# Patient Record
Sex: FEMALE | NOT HISPANIC OR LATINO
[De-identification: names, ages, dates, MRNs, and addresses within clinical notes are randomized per-mention and may not be internally consistent; named-entity substitution may affect disease eponyms.]

---

## 2018-12-27 ENCOUNTER — APPOINTMENT (OUTPATIENT)
Dept: OPHTHALMOLOGY | Facility: CLINIC | Age: 23
End: 2018-12-27

## 2020-01-17 ENCOUNTER — APPOINTMENT (OUTPATIENT)
Dept: OPHTHALMOLOGY | Facility: CLINIC | Age: 25
End: 2020-01-17
Payer: COMMERCIAL

## 2020-01-17 ENCOUNTER — NON-APPOINTMENT (OUTPATIENT)
Age: 25
End: 2020-01-17

## 2020-01-17 PROCEDURE — 92004 COMPRE OPH EXAM NEW PT 1/>: CPT

## 2020-01-30 ENCOUNTER — APPOINTMENT (OUTPATIENT)
Dept: INTERNAL MEDICINE | Facility: CLINIC | Age: 25
End: 2020-01-30
Payer: COMMERCIAL

## 2020-01-30 ENCOUNTER — RESULT CHARGE (OUTPATIENT)
Age: 25
End: 2020-01-30

## 2020-01-30 ENCOUNTER — TRANSCRIPTION ENCOUNTER (OUTPATIENT)
Age: 25
End: 2020-01-30

## 2020-01-30 VITALS
HEIGHT: 63 IN | HEART RATE: 82 BPM | WEIGHT: 146 LBS | OXYGEN SATURATION: 100 % | DIASTOLIC BLOOD PRESSURE: 70 MMHG | BODY MASS INDEX: 25.87 KG/M2 | SYSTOLIC BLOOD PRESSURE: 110 MMHG | TEMPERATURE: 97.6 F

## 2020-01-30 DIAGNOSIS — Z00.00 ENCOUNTER FOR GENERAL ADULT MEDICAL EXAMINATION W/OUT ABNORMAL FINDINGS: ICD-10-CM

## 2020-01-30 PROCEDURE — 81025 URINE PREGNANCY TEST: CPT

## 2020-01-30 PROCEDURE — 99385 PREV VISIT NEW AGE 18-39: CPT | Mod: 25,GC

## 2020-01-30 PROCEDURE — 36415 COLL VENOUS BLD VENIPUNCTURE: CPT

## 2020-02-01 LAB
BASOPHILS # BLD AUTO: 0.03 K/UL
BASOPHILS NFR BLD AUTO: 0.4 %
EOSINOPHIL # BLD AUTO: 0 K/UL
EOSINOPHIL NFR BLD AUTO: 0 %
HCG UR QL: NEGATIVE
HCT VFR BLD CALC: 42.6 %
HGB BLD-MCNC: 13.5 G/DL
IMM GRANULOCYTES NFR BLD AUTO: 0.1 %
LYMPHOCYTES # BLD AUTO: 2.36 K/UL
LYMPHOCYTES NFR BLD AUTO: 33.8 %
MAN DIFF?: NORMAL
MCHC RBC-ENTMCNC: 29.1 PG
MCHC RBC-ENTMCNC: 31.7 GM/DL
MCV RBC AUTO: 91.8 FL
MONOCYTES # BLD AUTO: 0.78 K/UL
MONOCYTES NFR BLD AUTO: 11.2 %
NEUTROPHILS # BLD AUTO: 3.8 K/UL
NEUTROPHILS NFR BLD AUTO: 54.5 %
PLATELET # BLD AUTO: 299 K/UL
QUALITY CONTROL: YES
RBC # BLD: 4.64 M/UL
RBC # FLD: 13.7 %
T3FREE SERPL-MCNC: 3.47 PG/ML
T4 FREE SERPL-MCNC: 1.1 NG/DL
TSH SERPL-ACNC: 18.5 UIU/ML
WBC # FLD AUTO: 6.98 K/UL

## 2020-02-04 DIAGNOSIS — Z23 ENCOUNTER FOR IMMUNIZATION: ICD-10-CM

## 2020-02-19 ENCOUNTER — NON-APPOINTMENT (OUTPATIENT)
Age: 25
End: 2020-02-19

## 2020-02-20 DIAGNOSIS — T30.0 BURN OF UNSPECIFIED BODY REGION, UNSPECIFIED DEGREE: ICD-10-CM

## 2020-03-26 ENCOUNTER — APPOINTMENT (OUTPATIENT)
Dept: INTERNAL MEDICINE | Facility: CLINIC | Age: 25
End: 2020-03-26

## 2020-03-31 ENCOUNTER — RX RENEWAL (OUTPATIENT)
Age: 25
End: 2020-03-31

## 2020-04-26 ENCOUNTER — MESSAGE (OUTPATIENT)
Age: 25
End: 2020-04-26

## 2020-04-28 ENCOUNTER — APPOINTMENT (OUTPATIENT)
Dept: INTERNAL MEDICINE | Facility: CLINIC | Age: 25
End: 2020-04-28
Payer: COMMERCIAL

## 2020-04-28 PROCEDURE — 36415 COLL VENOUS BLD VENIPUNCTURE: CPT

## 2020-04-30 ENCOUNTER — TRANSCRIPTION ENCOUNTER (OUTPATIENT)
Age: 25
End: 2020-04-30

## 2020-04-30 LAB — TSH SERPL-ACNC: 5.65 UIU/ML

## 2020-04-30 RX ORDER — LEVOTHYROXINE SODIUM 0.1 MG/1
100 TABLET ORAL DAILY
Qty: 60 | Refills: 0 | Status: DISCONTINUED | COMMUNITY
Start: 2020-01-31 | End: 2020-04-30

## 2020-05-01 ENCOUNTER — APPOINTMENT (OUTPATIENT)
Dept: DISASTER EMERGENCY | Facility: CLINIC | Age: 25
End: 2020-05-01

## 2020-05-03 LAB
SARS-COV-2 IGG SERPL IA-ACNC: <0.1 INDEX
SARS-COV-2 IGG SERPL QL IA: NEGATIVE

## 2020-05-04 ENCOUNTER — APPOINTMENT (OUTPATIENT)
Dept: DISASTER EMERGENCY | Facility: CLINIC | Age: 25
End: 2020-05-04

## 2020-05-11 ENCOUNTER — APPOINTMENT (OUTPATIENT)
Dept: ORTHOPEDIC SURGERY | Facility: CLINIC | Age: 25
End: 2020-05-11
Payer: COMMERCIAL

## 2020-05-11 DIAGNOSIS — Z78.9 OTHER SPECIFIED HEALTH STATUS: ICD-10-CM

## 2020-05-11 DIAGNOSIS — M54.16 RADICULOPATHY, LUMBAR REGION: ICD-10-CM

## 2020-05-11 PROCEDURE — 99203 OFFICE O/P NEW LOW 30 MIN: CPT | Mod: 95

## 2020-05-11 NOTE — HISTORY OF PRESENT ILLNESS
[de-identified] : This visit was provided by the [facility] tele[facility] service.  This telehealth appointment was conducted using real-time 2-way audiovisual technology.  The patient Theresa Slade was at her home ([address] [place] [address]) during the time of the visit. The provider, Vinod García was located at his home in [place], [place] at the time of the visit.  The patient and Vinod García participated in the telehealth encounter.  Verbal consent was given on May 11, 2020 by patient Theresa Slade.\par \par HPI:\par Telehealth via [facility] tele[facility] service: 30 minutes\par \par 24 year old female presents with low back pain and new symptoms of pain traveling into her legs.  She says she has had low back pain for over 2 years which she has treated with acupuncture.  She cannot recall an inciting event or trauma for her low back pain.  More recently she has noticed pain traveling to her legs.   She says it travels to her bilateral anterior thighs. It can also travel to her gluteal region and posterior thighs bilaterally.  She says it does not travel past her knees.  She denies numbness, weakness, balance problems or bowel/bladder symptoms.  She has tried Advil which helps very little.  She says she thinks the leg pain started because she has been working and sheltering at home due to the Covid-19 pandemic. She says she has been inactive and she has no ergonomic chairs in her home.  She says the pain in her legs is not all the time. It usually happens if she has been sitting for extended periods. It feels better with ambulation.

## 2020-05-11 NOTE — PHYSICAL EXAM
[de-identified] : General: NAD AAOx4, well-appearing\par Respiratory: No visible respiratory distress\par Psych: Good mood and appropriate affect\par Skin: WWP, no rashes\par Gait: Normal gait, can do tandem gait\par MSK: Lumbar spine: no visible deformity in coronal or sagittal planes.  Patient indicates low posterior back as area of pain, she shows the pain radiates to her anterior thighs and also to her posterior thighs\par Neuro: Patient denies numbness, moving all extremities grossly\par

## 2020-05-11 NOTE — ASSESSMENT
[FreeTextEntry1] : 24 year old female with low back pain and lumbar radiculopathy.  She has had low back pain for 2 years but the leg pain is more recent occurring in the last 2 weeks. It travels to her thighs but not past the knees.  t is episodic and worse with prolonged sitting better with walking.   She is otherwise neurologically intact.  She will be given a prescription for meloxicam which the patient has tolerated in the past and has helped her with pain.   She was also given a referral for physical therapy and information on setting up a telehealth physical therapy appointment.  She will followup in 3 weeks via telehealth. She knows to call with any questions or concerns or if her symptoms acutely worsen.

## 2020-05-26 ENCOUNTER — TRANSCRIPTION ENCOUNTER (OUTPATIENT)
Age: 25
End: 2020-05-26

## 2020-06-01 ENCOUNTER — APPOINTMENT (OUTPATIENT)
Dept: ORTHOPEDIC SURGERY | Facility: CLINIC | Age: 25
End: 2020-06-01

## 2020-06-03 ENCOUNTER — APPOINTMENT (OUTPATIENT)
Dept: ORTHOPEDIC SURGERY | Facility: CLINIC | Age: 25
End: 2020-06-03
Payer: COMMERCIAL

## 2020-06-03 DIAGNOSIS — M54.5 LOW BACK PAIN: ICD-10-CM

## 2020-06-03 PROCEDURE — 99213 OFFICE O/P EST LOW 20 MIN: CPT | Mod: 95

## 2020-06-03 NOTE — PHYSICAL EXAM
[de-identified] : General: NAD AAOx4, well-appearing\par Respiratory: No visible respiratory distress\par Psych: Good mood and appropriate affect\par Skin: WWP, no rashes\par Gait: Normal gait, can do tandem gait\par MSK: Lumbar spine: no visible deformity in coronal or sagittal planes.  Patient indicates low posterior back as area of discomfort.  Denies radiating pain.\par Neuro: Patient denies numbness, moving all extremities grossly\par

## 2020-06-03 NOTE — ASSESSMENT
[FreeTextEntry1] : 24 year old female returns for followup.  Her leg pain has completely resolved and her low back pain has significantly improved. She is neurologically intact.   She has found the Meloxicam very helpful.  She has been back to doing HIT exercise.  She has not started PT but plans to do so in the future.  She will call to make a followup appointment.  She knows to call with any questions or concerns or if her symptoms acutely worsen.

## 2020-06-15 ENCOUNTER — TRANSCRIPTION ENCOUNTER (OUTPATIENT)
Age: 25
End: 2020-06-15

## 2020-06-16 ENCOUNTER — LABORATORY RESULT (OUTPATIENT)
Age: 25
End: 2020-06-16

## 2020-06-19 ENCOUNTER — TRANSCRIPTION ENCOUNTER (OUTPATIENT)
Age: 25
End: 2020-06-19

## 2020-06-24 ENCOUNTER — TRANSCRIPTION ENCOUNTER (OUTPATIENT)
Age: 25
End: 2020-06-24

## 2020-06-25 ENCOUNTER — TRANSCRIPTION ENCOUNTER (OUTPATIENT)
Age: 25
End: 2020-06-25

## 2020-07-07 ENCOUNTER — RX RENEWAL (OUTPATIENT)
Age: 25
End: 2020-07-07

## 2020-08-04 ENCOUNTER — APPOINTMENT (OUTPATIENT)
Dept: ENDOCRINOLOGY | Facility: CLINIC | Age: 25
End: 2020-08-04
Payer: COMMERCIAL

## 2020-08-04 VITALS
BODY MASS INDEX: 26.22 KG/M2 | SYSTOLIC BLOOD PRESSURE: 111 MMHG | DIASTOLIC BLOOD PRESSURE: 76 MMHG | WEIGHT: 148 LBS | HEIGHT: 63 IN | HEART RATE: 72 BPM

## 2020-08-04 PROCEDURE — 99204 OFFICE O/P NEW MOD 45 MIN: CPT

## 2020-08-04 RX ORDER — MELOXICAM 15 MG/1
15 TABLET ORAL
Qty: 30 | Refills: 1 | Status: DISCONTINUED | COMMUNITY
Start: 2020-05-11 | End: 2020-08-04

## 2020-08-04 RX ORDER — INFLUENZA A VIRUS A/WEST VIRGINIA/30/2022 (H1N1) RECOMBINANT HEMAGGLUTININ ANTIGEN, INFLUENZA A VIRUS A/DARWIN/6/2021 (H3N2) RECOMBINANT HEMAGGLUTININ ANTIGEN, INFLUENZA B VIRUS B/AUSTRIA/1359417/2021 RECOMBINANT HEMAGGLUTININ ANTIGEN, AND INFLUENZA B VIRUS B/PHUKET/3073/2013 RECOMBINANT HEMAGGLUTININ ANTIGEN 45; 45; 45; 45 UG/.5ML; UG/.5ML; UG/.5ML; UG/.5ML
0.5 INJECTION INTRAMUSCULAR
Refills: 0 | Status: DISCONTINUED | COMMUNITY
Start: 2020-02-04 | End: 2020-08-04

## 2020-08-05 LAB
TSH SERPL-ACNC: 2.33 UIU/ML
TTG IGA SER IA-ACNC: <1.2 U/ML
TTG IGA SER-ACNC: NEGATIVE
TTG IGG SER IA-ACNC: 1.9 U/ML
TTG IGG SER IA-ACNC: NEGATIVE

## 2020-08-21 ENCOUNTER — RX RENEWAL (OUTPATIENT)
Age: 25
End: 2020-08-21

## 2020-10-12 ENCOUNTER — RX RENEWAL (OUTPATIENT)
Age: 25
End: 2020-10-12

## 2021-01-20 ENCOUNTER — APPOINTMENT (OUTPATIENT)
Dept: INTERNAL MEDICINE | Facility: CLINIC | Age: 26
End: 2021-01-20

## 2021-01-20 LAB
25(OH)D3 SERPL-MCNC: 35.2 NG/ML
ALBUMIN SERPL ELPH-MCNC: 4.5 G/DL
ALP BLD-CCNC: 75 U/L
ALT SERPL-CCNC: 15 U/L
ANION GAP SERPL CALC-SCNC: 11 MMOL/L
AST SERPL-CCNC: 16 U/L
BASOPHILS # BLD AUTO: 0.03 K/UL
BASOPHILS NFR BLD AUTO: 0.5 %
BILIRUB SERPL-MCNC: 1.6 MG/DL
BUN SERPL-MCNC: 12 MG/DL
CALCIUM SERPL-MCNC: 9.7 MG/DL
CHLORIDE SERPL-SCNC: 100 MMOL/L
CO2 SERPL-SCNC: 26 MMOL/L
CREAT SERPL-MCNC: 0.9 MG/DL
EOSINOPHIL # BLD AUTO: 0 K/UL
EOSINOPHIL NFR BLD AUTO: 0 %
GLUCOSE SERPL-MCNC: 91 MG/DL
HCG SERPL-MCNC: <1 MIU/ML
HCT VFR BLD CALC: 42.5 %
HGB BLD-MCNC: 13.9 G/DL
IMM GRANULOCYTES NFR BLD AUTO: 0.2 %
LYMPHOCYTES # BLD AUTO: 2.04 K/UL
LYMPHOCYTES NFR BLD AUTO: 34.4 %
MAN DIFF?: NORMAL
MCHC RBC-ENTMCNC: 29.4 PG
MCHC RBC-ENTMCNC: 32.7 GM/DL
MCV RBC AUTO: 90 FL
MONOCYTES # BLD AUTO: 0.71 K/UL
MONOCYTES NFR BLD AUTO: 12 %
NEUTROPHILS # BLD AUTO: 3.14 K/UL
NEUTROPHILS NFR BLD AUTO: 52.9 %
PLATELET # BLD AUTO: 303 K/UL
POTASSIUM SERPL-SCNC: 4.7 MMOL/L
PROT SERPL-MCNC: 6.8 G/DL
RBC # BLD: 4.72 M/UL
RBC # FLD: 12.5 %
SODIUM SERPL-SCNC: 137 MMOL/L
T3FREE SERPL-MCNC: 3.25 PG/ML
T4 FREE SERPL-MCNC: 1.8 NG/DL
TSH SERPL-ACNC: 0.2 UIU/ML
VIT B12 SERPL-MCNC: 758 PG/ML
WBC # FLD AUTO: 5.93 K/UL

## 2021-01-20 NOTE — ADDENDUM
[FreeTextEntry1] : Recent TSH 2.33 uIU/mL and transglutaminase antibodies negative; discussed with Ms. Slade. We will continue levothyroxine 137 mcg daily for now, with plan to repeat TSH 6-8 weeks after cessation of her combined oral contraceptive pill. She is going to [place] at the end of the week for vacation. 8/05/20\par \par Ms. Slade discontinued her oral contraceptive pill about a month ago. She has had fatigue and increased hunger. Pregnancy tests have been negative. We will check complete blood count, comprehensive metabolic panel, TSH, vitamin B12, 25-hydroxyvitamin D. 9/18/20\par \par Recent laboratory results as below. TSH 0.20 uIU/mL and recommend adjustment in levothyroxine dose from 137 mcg daily to 137 mcg, 1 pill 6 days/week and 0.5 pill 1 day/week (average daily dose 127 mcg). Total bilirubin borderline elevated with liver function tests otherwise within range, likely due to Gilbert's disease. I will discuss with Ms. Slade at her upcoming appointment. 1/20/21

## 2021-01-20 NOTE — HISTORY OF PRESENT ILLNESS
[FreeTextEntry1] : Ms. Slade is a 24 year-old woman with a history of hypothyroidism presenting to [facility] care with me. \par \par Hypothyroidism. \par She was diagnosed with hypothyroidism in January 2020 in the setting of fatigue and weight gain.\par She was started on levothyroxine 100 mcg daily in January 2020, increased to 112 mcg in April and 137 mcg in June.\par She is taking levothyroxine in the morning, on an empty stomach, with plain water, and waiting at least 30 minutes before eating. She is not taking calcium/iron/multivitamin. 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 Her father is a cardiologist.

## 2021-01-20 NOTE — PHYSICAL EXAM
[Alert] : alert [Healthy Appearance] : healthy appearance [No Acute Distress] : no acute distress [Normal Sclera/Conjunctiva] : normal sclera/conjunctiva [No Neck Mass] : no neck mass was observed [No LAD] : no lymphadenopathy [Supple] : the neck was supple [Thyroid Not Enlarged] : the thyroid was not enlarged [No Respiratory Distress] : no respiratory distress [Clear to Auscultation] : lungs were clear to auscultation bilaterally [Normal Rate] : heart rate was normal [Normal S1, S2] : normal S1 and S2 [Regular Rhythm] : with a regular rhythm [No Stigmata of Cushings Syndrome] : no stigmata of Cushings Syndrome [Normal Gait] : normal gait [Normal Insight/Judgement] : insight and judgment were intact [Kyphosis] : no kyphosis present [Acanthosis Nigricans] : no acanthosis nigricans [de-identified] : no moon facies, no supraclavicular fat pads

## 2021-01-22 ENCOUNTER — APPOINTMENT (OUTPATIENT)
Dept: ENDOCRINOLOGY | Facility: CLINIC | Age: 26
End: 2021-01-22
Payer: COMMERCIAL

## 2021-01-22 DIAGNOSIS — R17 UNSPECIFIED JAUNDICE: ICD-10-CM

## 2021-01-22 PROCEDURE — 99214 OFFICE O/P EST MOD 30 MIN: CPT | Mod: 95

## 2021-01-22 RX ORDER — MELOXICAM 15 MG/1
15 TABLET ORAL
Qty: 60 | Refills: 0 | Status: DISCONTINUED | COMMUNITY
Start: 2020-06-03 | End: 2021-01-22

## 2021-01-22 RX ORDER — NORETHINDRONE ACETATE AND ETHINYL ESTRADIOL AND FERROUS FUMARATE 1MG-20(24)
KIT ORAL
Refills: 0 | Status: DISCONTINUED | COMMUNITY
End: 2021-01-22

## 2021-01-22 RX ORDER — SILVER SULFADIAZINE 10 MG/G
1 CREAM TOPICAL TWICE DAILY
Qty: 1 | Refills: 3 | Status: DISCONTINUED | COMMUNITY
Start: 2020-02-20 | End: 2021-01-22

## 2021-03-09 ENCOUNTER — TRANSCRIPTION ENCOUNTER (OUTPATIENT)
Age: 26
End: 2021-03-09

## 2021-03-16 ENCOUNTER — NON-APPOINTMENT (OUTPATIENT)
Age: 26
End: 2021-03-16

## 2021-03-18 ENCOUNTER — TRANSCRIPTION ENCOUNTER (OUTPATIENT)
Age: 26
End: 2021-03-18

## 2021-03-19 ENCOUNTER — NON-APPOINTMENT (OUTPATIENT)
Age: 26
End: 2021-03-19

## 2021-03-24 ENCOUNTER — APPOINTMENT (OUTPATIENT)
Dept: HUMAN REPRODUCTION | Facility: CLINIC | Age: 26
End: 2021-03-24
Payer: COMMERCIAL

## 2021-03-24 PROCEDURE — 99204 OFFICE O/P NEW MOD 45 MIN: CPT | Mod: 95

## 2021-04-13 ENCOUNTER — LABORATORY RESULT (OUTPATIENT)
Age: 26
End: 2021-04-13

## 2021-04-19 ENCOUNTER — OUTPATIENT (OUTPATIENT)
Dept: OUTPATIENT SERVICES | Facility: HOSPITAL | Age: 26
LOS: 1 days | End: 2021-04-19
Payer: COMMERCIAL

## 2021-04-19 ENCOUNTER — RESULT REVIEW (OUTPATIENT)
Age: 26
End: 2021-04-19

## 2021-04-19 DIAGNOSIS — N97.9 FEMALE INFERTILITY, UNSPECIFIED: ICD-10-CM

## 2021-04-19 PROCEDURE — 58340 CATHETER FOR HYSTEROGRAPHY: CPT | Mod: GC

## 2021-04-19 PROCEDURE — 74740 X-RAY FEMALE GENITAL TRACT: CPT | Mod: 26,GC

## 2021-04-23 DIAGNOSIS — N97.9 FEMALE INFERTILITY, UNSPECIFIED: ICD-10-CM

## 2021-04-28 ENCOUNTER — APPOINTMENT (OUTPATIENT)
Dept: HUMAN REPRODUCTION | Facility: CLINIC | Age: 26
End: 2021-04-28
Payer: COMMERCIAL

## 2021-04-28 PROCEDURE — 99215 OFFICE O/P EST HI 40 MIN: CPT | Mod: 95

## 2021-05-06 ENCOUNTER — APPOINTMENT (OUTPATIENT)
Dept: HUMAN REPRODUCTION | Facility: CLINIC | Age: 26
End: 2021-05-06

## 2021-05-14 ENCOUNTER — APPOINTMENT (OUTPATIENT)
Dept: HUMAN REPRODUCTION | Facility: CLINIC | Age: 26
End: 2021-05-14
Payer: COMMERCIAL

## 2021-05-14 PROCEDURE — 76830 TRANSVAGINAL US NON-OB: CPT

## 2021-05-14 PROCEDURE — 99072 ADDL SUPL MATRL&STAF TM PHE: CPT

## 2021-05-14 PROCEDURE — 36415 COLL VENOUS BLD VENIPUNCTURE: CPT

## 2021-05-14 PROCEDURE — 99213 OFFICE O/P EST LOW 20 MIN: CPT | Mod: 25

## 2021-05-20 ENCOUNTER — APPOINTMENT (OUTPATIENT)
Dept: HUMAN REPRODUCTION | Facility: CLINIC | Age: 26
End: 2021-05-20

## 2021-05-24 ENCOUNTER — APPOINTMENT (OUTPATIENT)
Dept: HUMAN REPRODUCTION | Facility: CLINIC | Age: 26
End: 2021-05-24
Payer: COMMERCIAL

## 2021-05-24 PROCEDURE — 36415 COLL VENOUS BLD VENIPUNCTURE: CPT

## 2021-05-24 PROCEDURE — 76830 TRANSVAGINAL US NON-OB: CPT

## 2021-05-24 PROCEDURE — 99072 ADDL SUPL MATRL&STAF TM PHE: CPT

## 2021-05-24 PROCEDURE — 99213 OFFICE O/P EST LOW 20 MIN: CPT | Mod: 25

## 2021-05-26 ENCOUNTER — APPOINTMENT (OUTPATIENT)
Dept: HUMAN REPRODUCTION | Facility: CLINIC | Age: 26
End: 2021-05-26
Payer: COMMERCIAL

## 2021-05-26 PROCEDURE — 99072 ADDL SUPL MATRL&STAF TM PHE: CPT

## 2021-05-26 PROCEDURE — 99213 OFFICE O/P EST LOW 20 MIN: CPT | Mod: 25

## 2021-05-26 PROCEDURE — 36415 COLL VENOUS BLD VENIPUNCTURE: CPT

## 2021-05-26 PROCEDURE — 76830 TRANSVAGINAL US NON-OB: CPT

## 2021-05-28 ENCOUNTER — APPOINTMENT (OUTPATIENT)
Dept: HUMAN REPRODUCTION | Facility: CLINIC | Age: 26
End: 2021-05-28
Payer: COMMERCIAL

## 2021-05-28 PROCEDURE — 58322 ARTIFICIAL INSEMINATION: CPT

## 2021-05-28 PROCEDURE — 89261 SPERM ISOLATION COMPLEX: CPT

## 2021-05-28 PROCEDURE — 99072 ADDL SUPL MATRL&STAF TM PHE: CPT

## 2021-05-28 PROCEDURE — 99213 OFFICE O/P EST LOW 20 MIN: CPT | Mod: 25

## 2021-06-11 ENCOUNTER — APPOINTMENT (OUTPATIENT)
Dept: HUMAN REPRODUCTION | Facility: CLINIC | Age: 26
End: 2021-06-11
Payer: COMMERCIAL

## 2021-06-11 PROCEDURE — 99213 OFFICE O/P EST LOW 20 MIN: CPT | Mod: 25

## 2021-06-11 PROCEDURE — 76830 TRANSVAGINAL US NON-OB: CPT

## 2021-06-11 PROCEDURE — 36415 COLL VENOUS BLD VENIPUNCTURE: CPT

## 2021-06-11 PROCEDURE — 99072 ADDL SUPL MATRL&STAF TM PHE: CPT

## 2021-06-21 ENCOUNTER — APPOINTMENT (OUTPATIENT)
Dept: HUMAN REPRODUCTION | Facility: CLINIC | Age: 26
End: 2021-06-21
Payer: COMMERCIAL

## 2021-06-21 PROCEDURE — 36415 COLL VENOUS BLD VENIPUNCTURE: CPT

## 2021-06-21 PROCEDURE — 99072 ADDL SUPL MATRL&STAF TM PHE: CPT

## 2021-06-21 PROCEDURE — 99213 OFFICE O/P EST LOW 20 MIN: CPT | Mod: 25

## 2021-06-21 PROCEDURE — 76830 TRANSVAGINAL US NON-OB: CPT

## 2021-06-23 ENCOUNTER — TRANSCRIPTION ENCOUNTER (OUTPATIENT)
Age: 26
End: 2021-06-23

## 2021-06-24 ENCOUNTER — APPOINTMENT (OUTPATIENT)
Dept: HUMAN REPRODUCTION | Facility: CLINIC | Age: 26
End: 2021-06-24

## 2021-06-24 ENCOUNTER — APPOINTMENT (OUTPATIENT)
Dept: HUMAN REPRODUCTION | Facility: CLINIC | Age: 26
End: 2021-06-24
Payer: COMMERCIAL

## 2021-06-24 ENCOUNTER — TRANSCRIPTION ENCOUNTER (OUTPATIENT)
Age: 26
End: 2021-06-24

## 2021-06-24 PROCEDURE — 76830 TRANSVAGINAL US NON-OB: CPT

## 2021-06-24 PROCEDURE — 36415 COLL VENOUS BLD VENIPUNCTURE: CPT

## 2021-06-24 PROCEDURE — 99072 ADDL SUPL MATRL&STAF TM PHE: CPT

## 2021-06-24 PROCEDURE — 99213 OFFICE O/P EST LOW 20 MIN: CPT | Mod: 25

## 2021-06-28 ENCOUNTER — APPOINTMENT (OUTPATIENT)
Dept: HUMAN REPRODUCTION | Facility: CLINIC | Age: 26
End: 2021-06-28

## 2021-06-28 ENCOUNTER — APPOINTMENT (OUTPATIENT)
Dept: HUMAN REPRODUCTION | Facility: CLINIC | Age: 26
End: 2021-06-28
Payer: COMMERCIAL

## 2021-06-28 PROCEDURE — 99072 ADDL SUPL MATRL&STAF TM PHE: CPT

## 2021-06-28 PROCEDURE — 58322 ARTIFICIAL INSEMINATION: CPT

## 2021-06-28 PROCEDURE — 89261 SPERM ISOLATION COMPLEX: CPT

## 2021-06-28 PROCEDURE — 76830 TRANSVAGINAL US NON-OB: CPT

## 2021-06-28 PROCEDURE — 99213 OFFICE O/P EST LOW 20 MIN: CPT | Mod: 25

## 2021-06-28 PROCEDURE — 36415 COLL VENOUS BLD VENIPUNCTURE: CPT

## 2021-06-29 ENCOUNTER — APPOINTMENT (OUTPATIENT)
Dept: HUMAN REPRODUCTION | Facility: CLINIC | Age: 26
End: 2021-06-29
Payer: COMMERCIAL

## 2021-06-29 PROCEDURE — 89261 SPERM ISOLATION COMPLEX: CPT

## 2021-06-29 PROCEDURE — 99072 ADDL SUPL MATRL&STAF TM PHE: CPT

## 2021-06-29 PROCEDURE — 58322 ARTIFICIAL INSEMINATION: CPT

## 2021-06-29 PROCEDURE — 99213 OFFICE O/P EST LOW 20 MIN: CPT | Mod: 25

## 2021-07-08 ENCOUNTER — APPOINTMENT (OUTPATIENT)
Dept: HUMAN REPRODUCTION | Facility: CLINIC | Age: 26
End: 2021-07-08

## 2021-07-13 ENCOUNTER — APPOINTMENT (OUTPATIENT)
Dept: HUMAN REPRODUCTION | Facility: CLINIC | Age: 26
End: 2021-07-13
Payer: COMMERCIAL

## 2021-07-13 PROCEDURE — 99072 ADDL SUPL MATRL&STAF TM PHE: CPT

## 2021-07-13 PROCEDURE — 99213 OFFICE O/P EST LOW 20 MIN: CPT | Mod: 25

## 2021-07-13 PROCEDURE — 76830 TRANSVAGINAL US NON-OB: CPT

## 2021-07-13 PROCEDURE — 36415 COLL VENOUS BLD VENIPUNCTURE: CPT

## 2021-07-15 ENCOUNTER — APPOINTMENT (OUTPATIENT)
Dept: HUMAN REPRODUCTION | Facility: CLINIC | Age: 26
End: 2021-07-15

## 2021-07-21 ENCOUNTER — APPOINTMENT (OUTPATIENT)
Dept: HUMAN REPRODUCTION | Facility: CLINIC | Age: 26
End: 2021-07-21

## 2021-07-28 ENCOUNTER — RESULT REVIEW (OUTPATIENT)
Age: 26
End: 2021-07-28

## 2021-07-29 ENCOUNTER — APPOINTMENT (OUTPATIENT)
Dept: HUMAN REPRODUCTION | Facility: CLINIC | Age: 26
End: 2021-07-29
Payer: COMMERCIAL

## 2021-07-29 PROCEDURE — 99213 OFFICE O/P EST LOW 20 MIN: CPT | Mod: 25

## 2021-07-29 PROCEDURE — 76817 TRANSVAGINAL US OBSTETRIC: CPT

## 2021-07-29 PROCEDURE — 99072 ADDL SUPL MATRL&STAF TM PHE: CPT

## 2021-09-09 ENCOUNTER — ASOB RESULT (OUTPATIENT)
Age: 26
End: 2021-09-09

## 2021-09-09 ENCOUNTER — APPOINTMENT (OUTPATIENT)
Dept: ANTEPARTUM | Facility: CLINIC | Age: 26
End: 2021-09-09
Payer: COMMERCIAL

## 2021-09-09 PROCEDURE — 76801 OB US < 14 WKS SINGLE FETUS: CPT

## 2021-09-09 PROCEDURE — 76813 OB US NUCHAL MEAS 1 GEST: CPT | Mod: 59

## 2021-09-09 PROCEDURE — 36416 COLLJ CAPILLARY BLOOD SPEC: CPT

## 2021-10-19 ENCOUNTER — LABORATORY RESULT (OUTPATIENT)
Age: 26
End: 2021-10-19

## 2021-10-28 ENCOUNTER — ASOB RESULT (OUTPATIENT)
Age: 26
End: 2021-10-28

## 2021-10-28 ENCOUNTER — APPOINTMENT (OUTPATIENT)
Dept: ANTEPARTUM | Facility: CLINIC | Age: 26
End: 2021-10-28
Payer: COMMERCIAL

## 2021-10-28 PROCEDURE — 76811 OB US DETAILED SNGL FETUS: CPT

## 2021-12-13 NOTE — ADDENDUM
[FreeTextEntry1] : Recent TSH 2.73 uIU/mL; discussed with Ms. Slade. I advised levothyroxine 137 mcg daily one week of the month and other weeks 137 mcg, 1 pill 6 days/week and 0.5 pill 1 day/week for goal 0.5-2.5 uIU/mL. I advised her to call me immediately if she becomes pregnant. 4/13/21\par \par Ms. Slade called to let me know she is pregnant. Her last menstrual period was 6/10/2021. She had a thyroid stimulating hormone level checked this morning and will email me the result as soon as possible. 7/13/21\par \par Recent TSH 7.39 uIU/mL; discussed with Ms. Slade. I advised an increase in levothyroxine to 150 mcg daily, with plan to repeat TSH in 5 weeks. Prescription sent to pharmacy. 7/14/21\par \par Ms. Slade emailed me with recent TSH 2.32 uIU/mL (normal: 0.2-2.5) on levothyroxine 150 mcg daily. She is at 10 weeks 5 days gestation. I recommend repeat TSH in 8 weeks. 8/24/21\par \par Recent TSH 3.07 uIU/mL on levothyroxine 150 mcg daily. She is at 18 weeks gestation. We will adjust levothyroxine to 150 mcg, 1 pill 6 days/week and 1.5 pills 1 day/week (average daily dose 160 mcg) for goal TSH 0.2-3.0 uIU/mL. We will plan to repeat TSH in 6-8 weeks. 10/20/21\par \par Ms. Mckeon sent me an email regarding recent TSH 1.69 uIU/mL; at goal on levothyroxine 150 mcg, 1 pill 6 days/week and 1.5 pills 1 day/week (average daily dose 160 mcg). She is at 24 weeks gestation. We will plan to repeat TSH in 8-10 weeks. 12/13/21\par \par Laboratories (November 30, 2021) reviewed and significant for: \par TSH 1.69 uIU/mL (normal: 0.2-3.0 for second trimester)

## 2021-12-13 NOTE — HISTORY OF PRESENT ILLNESS
[Home] : at home, [unfilled] , at the time of the visit. [Medical Office: ([facility])___] : at the medical office located in  [Verbal consent obtained from patient] : the patient, [unfilled] [FreeTextEntry1] : Ms. Slade is a 24 year-old woman with a history of hypothyroidism presenting for follow-up. I saw her for an initial visit in August 2020.\par \par Hypothyroidism. \par She was diagnosed with hypothyroidism in January 2020 in the setting of fatigue and weight gain.\par She was started on levothyroxine 100 mcg daily in January 2020, increased to 112 mcg daily in April 2020 and 137 mcg daily in June 2020.\par She is taking levothyroxine in the morning, on an empty stomach, with plain water, and waiting at least 30 minutes before eating. She is taking a prenatal vitamin and  from levothyroxine by at least four hours. \par No history of radiation exposure.\par No family history of thyroid disease.\par \par Interim History \par She has been off a combined oral contraceptive pill since the end of August. She is interested in pregnancy soon. \par Recent laboratory results as below. TSH 0.20 uIU/mL and recommend adjustment in levothyroxine dose from 137 mcg daily to 137 mcg, 1 pill 6 days/week and 0.5 pill 1 day/week (average daily dose 127 mcg). Total bilirubin borderline elevated with liver function tests otherwise within range, likely due to Gilbert's disease. \par She had fatigue late last year, now improved. \par She works in  for [employer]. Her father is a cardiologist.\par Medical and surgical history, medications, allergies, social and family history reviewed and updated as needed.

## 2021-12-13 NOTE — PHYSICAL EXAM
Initiate Treatment: Dry feet well, may use blow dryer Plan: Will send prescription once we received labs from pcp Dr. Carmenza Mcgregor [Alert] : alert Detail Level: Zone [Healthy Appearance] : healthy appearance [No Acute Distress] : no acute distress [Normal Sclera/Conjunctiva] : normal sclera/conjunctiva [Normal Insight/Judgement] : insight and judgment were intact [Normal Hearing] : hearing was normal [No Respiratory Distress] : no respiratory distress [Kyphosis] : no kyphosis present

## 2022-01-22 ENCOUNTER — NON-APPOINTMENT (OUTPATIENT)
Age: 27
End: 2022-01-22

## 2022-02-10 ENCOUNTER — APPOINTMENT (OUTPATIENT)
Dept: ENDOCRINOLOGY | Facility: CLINIC | Age: 27
End: 2022-02-10
Payer: COMMERCIAL

## 2022-02-10 PROCEDURE — 99212 OFFICE O/P EST SF 10 MIN: CPT | Mod: 95

## 2022-02-10 NOTE — HISTORY OF PRESENT ILLNESS
[FreeTextEntry1] : Ms. Slade is a 26 year-old woman with a history of hypothyroidism presenting for follow-up. She is a patient of Dr Richter. Levothyroxine dose has been titrated as below by Dr Richter in the setting of pregnancy. \par She is currently 34 weeks pregnant. She is taking levothyroxine 150mcg, 1 pill 6 days/week and 1.5 pills 1 day/week (average daily dose 160mcg).\par Feb 1, 2022 - TSH 2.68 and free T4 1.3\par \par Chart review:\par Hypothyroidism. \par She was diagnosed with hypothyroidism in January 2020 in the setting of fatigue and weight gain.\par She was started on levothyroxine 100 mcg daily in January 2020, increased to 112 mcg daily in April 2020 and 137 mcg daily in June 2020.\par She is taking levothyroxine in the morning, on an empty stomach, with plain water, and waiting at least 30 minutes before eating. She is taking a prenatal vitamin and  from levothyroxine by at least four hours. \par No history of radiation exposure.\par No family history of thyroid disease.\par \par Interim History - Jan 2021\par She has been off a combined oral contraceptive pill since the end of August. She is interested in pregnancy soon. \par Recent laboratory results as below. TSH 0.20 uIU/mL and recommend adjustment in levothyroxine dose from 137 mcg daily to 137 mcg, 1 pill 6 days/week and 0.5 pill 1 day/week (average daily dose 127 mcg). Total bilirubin borderline elevated with liver function tests otherwise within range, likely due to Gilbert's disease. \par She had fatigue late last year, now improved. 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 \par Hypothyroidism. \par She was diagnosed with hypothyroidism in January 2020 in the setting of fatigue and weight gain.\par She was started on levothyroxine 100 mcg daily in January 2020, increased to 112 mcg daily in April 2020 and 137 mcg daily in June 2020.\par She is taking levothyroxine in the morning, on an empty stomach, with plain water, and waiting at least 30 minutes before eating. She is taking a prenatal vitamin and  from levothyroxine by at least four hours. \par No history of radiation exposure.\par No family history of thyroid disease.\par \par Recent TSH 2.73 uIU/mL; discussed with Ms. Slade. I advised levothyroxine 137 mcg daily one week of the month and other weeks 137 mcg, 1 pill 6 days/week and 0.5 pill 1 day/week for goal 0.5-2.5 uIU/mL. I advised her to call me immediately if she becomes pregnant. 4/13/21\par \par Ms. Slade called to let me know she is pregnant. Her last menstrual period was 6/10/2021. She had a thyroid stimulating hormone level checked this morning and will email me the result as soon as possible. 7/13/21\par \par Recent TSH 7.39 uIU/mL; discussed with Ms. Slade. I advised an increase in levothyroxine to 150 mcg daily, with plan to repeat TSH in 5 weeks. Prescription sent to pharmacy. 7/14/21\par \par Ms. Slade emailed me with recent TSH 2.32 uIU/mL (normal: 0.2-2.5) on levothyroxine 150 mcg daily. She is at 10 weeks 5 days gestation. I recommend repeat TSH in 8 weeks. 8/24/21\par \par Recent TSH 3.07 uIU/mL on levothyroxine 150 mcg daily. She is at 18 weeks gestation. We will adjust levothyroxine to 150 mcg, 1 pill 6 days/week and 1.5 pills 1 day/week (average daily dose 160 mcg) for goal TSH 0.2-3.0 uIU/mL. We will plan to repeat TSH in 6-8 weeks. 10/20/21\par \par Ms. Mckeon sent me an email regarding recent TSH 1.69 uIU/mL; at goal on levothyroxine 150 mcg, 1 pill 6 days/week and 1.5 pills 1 day/week (average daily dose 160 mcg). She is at 24 weeks gestation. We will plan to repeat TSH in 8-10 weeks. 12/13/21\par \par Laboratories (November 30, 2021) reviewed and significant for: \par TSH 1.69 uIU/mL (normal: 0.2-3.0 for second trimester) \par \par

## 2022-02-10 NOTE — ASSESSMENT
[FreeTextEntry1] : Ms. Slade is a 26 year-old woman with a history of hypothyroidism presenting for follow-up. She is a patient of Dr Richter. Levothyroxine dose has been titrated by Dr Richter in the setting of pregnancy. \par \par She is currently 34 weeks pregnant. She is taking levothyroxine 150mcg, 1 pill 6 days/week and 1.5 pills 1 day/week (average daily dose 160mcg).\par Feb 1, 2022 - TSH 2.68 and free T4 1.3\par \par Plan: TSH is at goal, which is <3.0 in third trimester. We will continue levothyroxine 150mcg, 1 pill 6 days/week and 1.5 pills 1 day/week (average daily dose 160mcg). Rx sent to preferred pharmacy.\par \par Post partum she will return to prenatal dose of 137mcg, 1 pill 6 days/week and 0.5 pill 1 day/week. Repeat TFTs 8 weeks post delivery. Will send this script in about 4 weeks. She expressed understanding of plan.\par \par Discussed with Dr Richter.

## 2022-02-10 NOTE — REASON FOR VISIT
[Follow - Up] : a follow-up visit [Hypothyroidism] : hypothyroidism [Home] : at home, [unfilled] , at the time of the visit. [Medical Office: ([facility])___] : at the medical office located in  [Verbal consent obtained from patient] : the patient, [unfilled]

## 2022-02-28 ENCOUNTER — APPOINTMENT (OUTPATIENT)
Dept: ANTEPARTUM | Facility: CLINIC | Age: 27
End: 2022-02-28

## 2022-03-07 DIAGNOSIS — R05.9 COUGH, UNSPECIFIED: ICD-10-CM

## 2022-03-21 ENCOUNTER — INPATIENT (INPATIENT)
Facility: HOSPITAL | Age: 27
LOS: 1 days | Discharge: HOME | End: 2022-03-23
Attending: OBSTETRICS & GYNECOLOGY | Admitting: OBSTETRICS & GYNECOLOGY
Payer: COMMERCIAL

## 2022-03-21 VITALS
OXYGEN SATURATION: 97 % | TEMPERATURE: 98 F | HEART RATE: 84 BPM | DIASTOLIC BLOOD PRESSURE: 62 MMHG | SYSTOLIC BLOOD PRESSURE: 101 MMHG | RESPIRATION RATE: 17 BRPM

## 2022-03-21 DIAGNOSIS — O26.899 OTHER SPECIFIED PREGNANCY RELATED CONDITIONS, UNSPECIFIED TRIMESTER: ICD-10-CM

## 2022-03-21 DIAGNOSIS — Z34.90 ENCOUNTER FOR SUPERVISION OF NORMAL PREGNANCY, UNSPECIFIED, UNSPECIFIED TRIMESTER: ICD-10-CM

## 2022-03-21 DIAGNOSIS — Z34.80 ENCOUNTER FOR SUPERVISION OF OTHER NORMAL PREGNANCY, UNSPECIFIED TRIMESTER: ICD-10-CM

## 2022-03-21 DIAGNOSIS — Z3A.00 WEEKS OF GESTATION OF PREGNANCY NOT SPECIFIED: ICD-10-CM

## 2022-03-21 NOTE — OB PROVIDER H&P - PROBLEM SELECTOR PLAN 1
-Admit to L and D  -Routine labs  -NPO/IVF  -Bictra  -EFM/toco  -Anesthesia consult  -Expectant management  -Anticipate

## 2022-03-21 NOTE — OB PROVIDER H&P - ASSESSMENT
26 year old  at 40.0 weeks not ruptured, early labor, oligohydraminos (TRINH 4.7)    -Admit to L and D  -Routine labs  -NPO/IVF  -Bictra  -EFM/toco  -Anesthesia consult  -Expectant management  -Anticipate     d/w MD Jimmy Steinberg fN-BC

## 2022-03-21 NOTE — OB PROVIDER H&P - HISTORY OF PRESENT ILLNESS
26 year old  at 40.0 weeks presents with ctxs q5min all day and LOF, Uncomplicated pregnancy. -GBS    OBHx: primigravida  MedHx: hypothyroidism  SurgHx: denies  GynHx: denies fibroids, cysts, abnormal paps, STIs  SocialHx: denies ETOH, tobacco, drug use  PyschHx: denies anxiety, depression  All: NKDA, NKEA, NKFA  Meds: PNV, Synthroid 150mcg PO daily    Vital Signs Last 24 Hrs  T(C): 36.6 (21 Mar 2022 22:36), Max: 36.6 (21 Mar 2022 22:36)  T(F): 97.9 (21 Mar 2022 22:36), Max: 97.9 (21 Mar 2022 22:36)  HR: 70 (21 Mar 2022 23:46) (70 - 97)  BP: 101/62 (21 Mar 2022 22:36) (101/62 - 101/62)  BP(mean): --  RR: 17 (21 Mar 2022 22:36) (17 - 17)  SpO2: 98% (21 Mar 2022 23:46) (90% - 99%)    PE: NAD, AOx3, abdomen soft gravid  VE: 2.5/80/-3  EFM: indeterminate  [place]: 5-7 min  EFW: 3000 grams  Sono: vertex, TRINH 4.7

## 2022-03-22 LAB
BASOPHILS # BLD AUTO: 0.03 K/UL — SIGNIFICANT CHANGE UP (ref 0–0.2)
BASOPHILS NFR BLD AUTO: 0.2 % — SIGNIFICANT CHANGE UP (ref 0–2)
BLD GP AB SCN SERPL QL: NEGATIVE — SIGNIFICANT CHANGE UP
COVID-19 SPIKE DOMAIN AB INTERP: POSITIVE
COVID-19 SPIKE DOMAIN ANTIBODY RESULT: >250 U/ML — HIGH
EOSINOPHIL # BLD AUTO: 0 K/UL — SIGNIFICANT CHANGE UP (ref 0–0.5)
EOSINOPHIL NFR BLD AUTO: 0 % — SIGNIFICANT CHANGE UP (ref 0–6)
HCT VFR BLD CALC: 37.3 % — SIGNIFICANT CHANGE UP (ref 34.5–45)
HGB BLD-MCNC: 12.6 G/DL — SIGNIFICANT CHANGE UP (ref 11.5–15.5)
IMM GRANULOCYTES NFR BLD AUTO: 1 % — SIGNIFICANT CHANGE UP (ref 0–1.5)
LYMPHOCYTES # BLD AUTO: 1.97 K/UL — SIGNIFICANT CHANGE UP (ref 1–3.3)
LYMPHOCYTES # BLD AUTO: 15.3 % — SIGNIFICANT CHANGE UP (ref 13–44)
MCHC RBC-ENTMCNC: 31.5 PG — SIGNIFICANT CHANGE UP (ref 27–34)
MCHC RBC-ENTMCNC: 33.8 GM/DL — SIGNIFICANT CHANGE UP (ref 32–36)
MCV RBC AUTO: 93.3 FL — SIGNIFICANT CHANGE UP (ref 80–100)
MONOCYTES # BLD AUTO: 1.26 K/UL — HIGH (ref 0–0.9)
MONOCYTES NFR BLD AUTO: 9.8 % — SIGNIFICANT CHANGE UP (ref 2–14)
NEUTROPHILS # BLD AUTO: 9.5 K/UL — HIGH (ref 1.8–7.4)
NEUTROPHILS NFR BLD AUTO: 73.7 % — SIGNIFICANT CHANGE UP (ref 43–77)
NRBC # BLD: 0 /100 WBCS — SIGNIFICANT CHANGE UP (ref 0–0)
PLATELET # BLD AUTO: 215 K/UL — SIGNIFICANT CHANGE UP (ref 150–400)
RBC # BLD: 4 M/UL — SIGNIFICANT CHANGE UP (ref 3.8–5.2)
RBC # FLD: 13.1 % — SIGNIFICANT CHANGE UP (ref 10.3–14.5)
RH IG SCN BLD-IMP: POSITIVE — SIGNIFICANT CHANGE UP
SARS-COV-2 IGG+IGM SERPL QL IA: >250 U/ML — HIGH
SARS-COV-2 IGG+IGM SERPL QL IA: POSITIVE
T PALLIDUM AB TITR SER: NEGATIVE — SIGNIFICANT CHANGE UP
WBC # BLD: 12.89 K/UL — HIGH (ref 3.8–10.5)
WBC # FLD AUTO: 12.89 K/UL — HIGH (ref 3.8–10.5)

## 2022-03-22 RX ORDER — OXYTOCIN 10 UNIT/ML
333.33 VIAL (ML) INJECTION
Qty: 20 | Refills: 0 | Status: DISCONTINUED | OUTPATIENT
Start: 2022-03-22 | End: 2022-03-23

## 2022-03-22 RX ORDER — PRAMOXINE HYDROCHLORIDE 150 MG/15G
1 AEROSOL, FOAM RECTAL EVERY 4 HOURS
Refills: 0 | Status: DISCONTINUED | OUTPATIENT
Start: 2022-03-22 | End: 2022-03-23

## 2022-03-22 RX ORDER — DIBUCAINE 1 %
1 OINTMENT (GRAM) RECTAL EVERY 6 HOURS
Refills: 0 | Status: DISCONTINUED | OUTPATIENT
Start: 2022-03-22 | End: 2022-03-23

## 2022-03-22 RX ORDER — CITRIC ACID/SODIUM CITRATE 300-500 MG
15 SOLUTION, ORAL ORAL EVERY 6 HOURS
Refills: 0 | Status: DISCONTINUED | OUTPATIENT
Start: 2022-03-22 | End: 2022-03-22

## 2022-03-22 RX ORDER — LANOLIN
1 OINTMENT (GRAM) TOPICAL EVERY 6 HOURS
Refills: 0 | Status: DISCONTINUED | OUTPATIENT
Start: 2022-03-22 | End: 2022-03-23

## 2022-03-22 RX ORDER — IBUPROFEN 200 MG
600 TABLET ORAL EVERY 6 HOURS
Refills: 0 | Status: DISCONTINUED | OUTPATIENT
Start: 2022-03-22 | End: 2022-03-23

## 2022-03-22 RX ORDER — OXYCODONE HYDROCHLORIDE 5 MG/1
5 TABLET ORAL
Refills: 0 | Status: DISCONTINUED | OUTPATIENT
Start: 2022-03-22 | End: 2022-03-23

## 2022-03-22 RX ORDER — DIPHENHYDRAMINE HCL 50 MG
25 CAPSULE ORAL EVERY 6 HOURS
Refills: 0 | Status: DISCONTINUED | OUTPATIENT
Start: 2022-03-22 | End: 2022-03-23

## 2022-03-22 RX ORDER — OXYCODONE HYDROCHLORIDE 5 MG/1
5 TABLET ORAL ONCE
Refills: 0 | Status: DISCONTINUED | OUTPATIENT
Start: 2022-03-22 | End: 2022-03-23

## 2022-03-22 RX ORDER — SODIUM CHLORIDE 9 MG/ML
1000 INJECTION, SOLUTION INTRAVENOUS
Refills: 0 | Status: DISCONTINUED | OUTPATIENT
Start: 2022-03-22 | End: 2022-03-22

## 2022-03-22 RX ORDER — KETOROLAC TROMETHAMINE 30 MG/ML
30 SYRINGE (ML) INJECTION ONCE
Refills: 0 | Status: DISCONTINUED | OUTPATIENT
Start: 2022-03-22 | End: 2022-03-22

## 2022-03-22 RX ORDER — HYDROCORTISONE 1 %
1 OINTMENT (GRAM) TOPICAL EVERY 6 HOURS
Refills: 0 | Status: DISCONTINUED | OUTPATIENT
Start: 2022-03-22 | End: 2022-03-23

## 2022-03-22 RX ORDER — ACETAMINOPHEN 500 MG
975 TABLET ORAL
Refills: 0 | Status: DISCONTINUED | OUTPATIENT
Start: 2022-03-22 | End: 2022-03-23

## 2022-03-22 RX ORDER — SODIUM CHLORIDE 9 MG/ML
3 INJECTION INTRAMUSCULAR; INTRAVENOUS; SUBCUTANEOUS EVERY 8 HOURS
Refills: 0 | Status: DISCONTINUED | OUTPATIENT
Start: 2022-03-22 | End: 2022-03-23

## 2022-03-22 RX ORDER — AER TRAVELER 0.5 G/1
1 SOLUTION RECTAL; TOPICAL EVERY 4 HOURS
Refills: 0 | Status: DISCONTINUED | OUTPATIENT
Start: 2022-03-22 | End: 2022-03-23

## 2022-03-22 RX ORDER — BENZOCAINE 10 %
1 GEL (GRAM) MUCOUS MEMBRANE EVERY 6 HOURS
Refills: 0 | Status: DISCONTINUED | OUTPATIENT
Start: 2022-03-22 | End: 2022-03-23

## 2022-03-22 RX ORDER — TETANUS TOXOID, REDUCED DIPHTHERIA TOXOID AND ACELLULAR PERTUSSIS VACCINE, ADSORBED 5; 2.5; 8; 8; 2.5 [IU]/.5ML; [IU]/.5ML; UG/.5ML; UG/.5ML; UG/.5ML
0.5 SUSPENSION INTRAMUSCULAR ONCE
Refills: 0 | Status: DISCONTINUED | OUTPATIENT
Start: 2022-03-22 | End: 2022-03-23

## 2022-03-22 RX ORDER — SIMETHICONE 80 MG/1
80 TABLET, CHEWABLE ORAL EVERY 4 HOURS
Refills: 0 | Status: DISCONTINUED | OUTPATIENT
Start: 2022-03-22 | End: 2022-03-23

## 2022-03-22 RX ORDER — MAGNESIUM HYDROXIDE 400 MG/1
30 TABLET, CHEWABLE ORAL
Refills: 0 | Status: DISCONTINUED | OUTPATIENT
Start: 2022-03-22 | End: 2022-03-23

## 2022-03-22 RX ORDER — IBUPROFEN 200 MG
600 TABLET ORAL EVERY 6 HOURS
Refills: 0 | Status: COMPLETED | OUTPATIENT
Start: 2022-03-22 | End: 2023-02-18

## 2022-03-22 RX ADMIN — Medication 975 MILLIGRAM(S): at 15:50

## 2022-03-22 RX ADMIN — Medication 1 TABLET(S): at 11:37

## 2022-03-22 RX ADMIN — Medication 975 MILLIGRAM(S): at 21:17

## 2022-03-22 RX ADMIN — Medication 600 MILLIGRAM(S): at 19:01

## 2022-03-22 RX ADMIN — Medication 600 MILLIGRAM(S): at 18:34

## 2022-03-22 RX ADMIN — Medication 975 MILLIGRAM(S): at 15:20

## 2022-03-22 RX ADMIN — Medication 975 MILLIGRAM(S): at 07:43

## 2022-03-22 RX ADMIN — Medication 600 MILLIGRAM(S): at 12:10

## 2022-03-22 RX ADMIN — Medication 975 MILLIGRAM(S): at 22:00

## 2022-03-22 RX ADMIN — Medication 975 MILLIGRAM(S): at 08:44

## 2022-03-22 RX ADMIN — Medication 30 MILLIGRAM(S): at 06:51

## 2022-03-22 RX ADMIN — Medication 600 MILLIGRAM(S): at 11:37

## 2022-03-22 NOTE — OB PROVIDER DELIVERY SUMMARY - NSPROVIDERDELIVERYNOTE_OBGYN_ALL_OB_FT
, pt delivered of a viable male infanmt apgar 9/9, placenta complete and uterus explored. second degree laceration. ebl 250 cc.

## 2022-03-22 NOTE — OB RN PATIENT PROFILE - FALL HARM RISK - UNIVERSAL INTERVENTIONS
Bed in lowest position, wheels locked, appropriate side rails in place/Call bell, personal items and telephone in reach/Instruct patient to call for assistance before getting out of bed or chair/Non-slip footwear when patient is out of bed/[place] to call system/Physically safe environment - no spills, clutter or unnecessary equipment/Purposeful Proactive Rounding/Room/bathroom lighting operational, light cord in reach

## 2022-03-22 NOTE — PRE-ANESTHESIA EVALUATION ADULT - NSANTHGENDERRD_ENT_A_CORE
Received fax from pharmacy requesting refill on pts medication(s). Pt was last seen in office on 6/26/20  and has a follow up scheduled for 7/24/2020. Will send request to  Tatiana Grimm  for patient.      Requested Prescriptions     Pending Prescriptions Disp Refills    omega-3 acid ethyl esters (LOVAZA) 1 g capsule 120 capsule 11     Sig: Take 2 capsules by mouth 2 times daily
No

## 2022-03-22 NOTE — OB RN DELIVERY SUMMARY - NS_SEPSISRSKCALC_OBGYN_ALL_OB_FT
EOS calculated successfully. EOS Risk Factor: 0.5/1000 live births ([facility] national incidence); GA=40w1d; Temp=98.6; ROM=9.05; GBS='Negative'; Antibiotics='No antibiotics or any antibiotics < 2 hrs prior to birth'

## 2022-03-22 NOTE — OB RN PATIENT PROFILE - PRO RUBELLA INFANT
High Dose Vitamin A Pregnancy And Lactation Text: High dose vitamin A therapy is contraindicated during pregnancy and breast feeding. immune

## 2022-03-22 NOTE — OB RN DELIVERY SUMMARY - NSSELHIDDEN_OBGYN_ALL_OB_FT
[NS_DeliveryAttending1_OBGYN_ALL_OB_FT:[email or web address]=],[NS_DeliveryRN_OBGYN_ALL_OB_FT:[id number]==]

## 2022-03-23 ENCOUNTER — TRANSCRIPTION ENCOUNTER (OUTPATIENT)
Age: 27
End: 2022-03-23

## 2022-03-23 VITALS
RESPIRATION RATE: 18 BRPM | TEMPERATURE: 98 F | SYSTOLIC BLOOD PRESSURE: 101 MMHG | HEART RATE: 80 BPM | DIASTOLIC BLOOD PRESSURE: 61 MMHG | OXYGEN SATURATION: 98 %

## 2022-03-23 PROCEDURE — 94640 AIRWAY INHALATION TREATMENT: CPT

## 2022-03-23 PROCEDURE — 86780 TREPONEMA PALLIDUM: CPT

## 2022-03-23 PROCEDURE — 86850 RBC ANTIBODY SCREEN: CPT

## 2022-03-23 PROCEDURE — G0463: CPT

## 2022-03-23 PROCEDURE — 85025 COMPLETE CBC W/AUTO DIFF WBC: CPT

## 2022-03-23 PROCEDURE — 86900 BLOOD TYPING SEROLOGIC ABO: CPT

## 2022-03-23 PROCEDURE — 59050 FETAL MONITOR W/REPORT: CPT

## 2022-03-23 PROCEDURE — 86901 BLOOD TYPING SEROLOGIC RH(D): CPT

## 2022-03-23 PROCEDURE — 86769 SARS-COV-2 COVID-19 ANTIBODY: CPT

## 2022-03-23 PROCEDURE — 59025 FETAL NON-STRESS TEST: CPT

## 2022-03-23 RX ORDER — ACETAMINOPHEN 500 MG
3 TABLET ORAL
Qty: 0 | Refills: 0 | DISCHARGE
Start: 2022-03-23

## 2022-03-23 RX ORDER — IBUPROFEN 200 MG
1 TABLET ORAL
Qty: 0 | Refills: 0 | DISCHARGE
Start: 2022-03-23

## 2022-03-23 RX ORDER — FLUTICASONE PROPIONATE 50 MCG
1 SPRAY, SUSPENSION NASAL
Refills: 0 | Status: DISCONTINUED | OUTPATIENT
Start: 2022-03-23 | End: 2022-03-23

## 2022-03-23 RX ADMIN — Medication 600 MILLIGRAM(S): at 05:32

## 2022-03-23 RX ADMIN — Medication 600 MILLIGRAM(S): at 06:23

## 2022-03-23 RX ADMIN — Medication 975 MILLIGRAM(S): at 09:26

## 2022-03-23 RX ADMIN — Medication 600 MILLIGRAM(S): at 12:30

## 2022-03-23 RX ADMIN — Medication 1 TABLET(S): at 11:58

## 2022-03-23 RX ADMIN — Medication 600 MILLIGRAM(S): at 11:58

## 2022-03-23 RX ADMIN — Medication 1 SPRAY(S): at 11:59

## 2022-03-23 RX ADMIN — Medication 600 MILLIGRAM(S): at 00:09

## 2022-03-23 RX ADMIN — Medication 975 MILLIGRAM(S): at 10:00

## 2022-03-23 RX ADMIN — Medication 600 MILLIGRAM(S): at 01:00

## 2022-03-23 NOTE — DISCHARGE NOTE OB - NS MD DC FALL RISK RISK
For information on Fall & Injury Prevention, visit: [email or web address] OR  [email or web address] OR  https://www.cdc.gov/steadi/patient.html

## 2022-03-23 NOTE — PROGRESS NOTE ADULT - ASSESSMENT
A/P: 27yo PPD#1 s/p .  Patient is stable and doing well post-partum.   - Pain well controlled, continue current pain regimen  - Increase ambulation, SCDs when not ambulating  - Continue regular diet    Alda Norris, PGY1

## 2022-03-23 NOTE — DISCHARGE NOTE OB - CARE PLAN
1 Principal Discharge DX:	 (normal spontaneous vaginal delivery)  Assessment and plan of treatment:	Return to baseline health, regular diet, pain control. Follow up with Dr. Marquez.

## 2022-03-23 NOTE — PROGRESS NOTE ADULT - SUBJECTIVE AND OBJECTIVE BOX
OB Progress Note:  PPD#1    S: 25yo  PPD#1 s/p . Patient feels well. Pain is well controlled, tolerating regular diet, passing flatus, voiding spontaneously, ambulating without difficulty. Denies heavy vaginal bleeding, CP/SOB, N/V, lightheadedness/dizziness.     O:  Vitals:  Vital Signs Last 24 Hrs  T(C): 36.4 (23 Mar 2022 06:33), Max: 36.9 (22 Mar 2022 12:39)  T(F): 97.5 (23 Mar 2022 06:33), Max: 98.4 (22 Mar 2022 12:39)  HR: 80 (23 Mar 2022 06:33) (75 - 90)  BP: 101/61 (23 Mar 2022 06:33) (93/68 - 115/75)  BP(mean): 86 (22 Mar 2022 07:45) (77 - 86)  RR: 18 (23 Mar 2022 06:33) (18 - 18)  SpO2: 98% (23 Mar 2022 06:33) (88% - 99%)    MEDICATIONS  (STANDING):  acetaminophen     Tablet .. 975 milliGRAM(s) Oral <User Schedule>  diphtheria/tetanus/pertussis (acellular) Vaccine (ADAcel) 0.5 milliLiter(s) IntraMuscular once  fluticasone propionate 50 MICROgram(s)/spray Nasal Spray 1 Spray(s) Both Nostrils two times a day  ibuprofen  Tablet. 600 milliGRAM(s) Oral every 6 hours  oxytocin Infusion 333.333 milliUNIT(s)/Min (1000 mL/Hr) IV Continuous <Continuous>  oxytocin Infusion 333.333 milliUNIT(s)/Min (1000 mL/Hr) IV Continuous <Continuous>  prenatal multivitamin 1 Tablet(s) Oral daily  sodium chloride 0.9% lock flush 3 milliLiter(s) IV Push every 8 hours      Labs:  Blood type: O Positive  Rubella IgG: RPR: Negative                          12.6   12.89<H> >-----------< 215    (  @ 01:13 )             37.3      Physical Exam:  General: NAD  Abdomen: soft, non-tender, non-distended, fundus firm  Vaginal: No heavy vaginal bleeding  Extremities: No erythema/edema

## 2022-03-23 NOTE — DISCHARGE NOTE OB - PATIENT PORTAL LINK FT
You can access the FollowMyHealth Patient Portal offered by [facility] by registering at the following website: [email or web address]. By joining [employer]’s FollowMyHealth portal, you will also be able to view your health information using other applications (apps) compatible with our system.

## 2022-03-23 NOTE — DISCHARGE NOTE OB - CARE PROVIDER_API CALL
Sumi Marquez  OBSTETRICS AND GYNECOLOGY  [address], [address]  [place] [address]  Phone: [phone]  Fax: [phone]  Follow Up Time:

## 2022-03-30 ENCOUNTER — NON-APPOINTMENT (OUTPATIENT)
Age: 27
End: 2022-03-30

## 2022-05-10 RX ORDER — LEVOTHYROXINE SODIUM 125 MCG
1 TABLET ORAL
Qty: 0 | Refills: 0 | DISCHARGE

## 2022-07-05 PROBLEM — E03.9 HYPOTHYROIDISM, UNSPECIFIED: Chronic | Status: ACTIVE | Noted: 2022-03-21

## 2022-07-11 ENCOUNTER — APPOINTMENT (OUTPATIENT)
Dept: ENDOCRINOLOGY | Facility: CLINIC | Age: 27
End: 2022-07-11

## 2022-07-11 VITALS
DIASTOLIC BLOOD PRESSURE: 68 MMHG | WEIGHT: 142 LBS | HEART RATE: 66 BPM | BODY MASS INDEX: 25.16 KG/M2 | SYSTOLIC BLOOD PRESSURE: 104 MMHG | HEIGHT: 63 IN

## 2022-07-11 DIAGNOSIS — E03.9 HYPOTHYROIDISM, UNSPECIFIED: ICD-10-CM

## 2022-07-11 DIAGNOSIS — R53.83 OTHER FATIGUE: ICD-10-CM

## 2022-07-11 PROCEDURE — 99214 OFFICE O/P EST MOD 30 MIN: CPT

## 2022-07-11 RX ORDER — CYCLOBENZAPRINE HYDROCHLORIDE 5 MG/1
5 TABLET, FILM COATED ORAL
Qty: 7 | Refills: 3 | Status: DISCONTINUED | COMMUNITY
Start: 2021-03-24 | End: 2022-07-11

## 2022-07-11 RX ORDER — NORETHINDRONE 0.35 MG/1
0.35 TABLET ORAL
Qty: 84 | Refills: 0 | Status: ACTIVE | COMMUNITY
Start: 2022-06-28

## 2022-07-11 RX ORDER — AZITHROMYCIN 250 MG/1
250 TABLET, FILM COATED ORAL
Qty: 1 | Refills: 0 | Status: DISCONTINUED | COMMUNITY
Start: 2022-03-07 | End: 2022-07-11

## 2022-07-12 LAB
25(OH)D3 SERPL-MCNC: 39 NG/ML
ALBUMIN SERPL ELPH-MCNC: 4.9 G/DL
ALP BLD-CCNC: 88 U/L
ALT SERPL-CCNC: 15 U/L
ANION GAP SERPL CALC-SCNC: 14 MMOL/L
AST SERPL-CCNC: 16 U/L
BASOPHILS # BLD AUTO: 0.05 K/UL
BASOPHILS NFR BLD AUTO: 0.7 %
BILIRUB SERPL-MCNC: 1.1 MG/DL
BUN SERPL-MCNC: 19 MG/DL
CALCIUM SERPL-MCNC: 9.7 MG/DL
CHLORIDE SERPL-SCNC: 101 MMOL/L
CO2 SERPL-SCNC: 24 MMOL/L
CREAT SERPL-MCNC: 0.87 MG/DL
EGFR: 94 ML/MIN/1.73M2
EOSINOPHIL # BLD AUTO: 0.01 K/UL
EOSINOPHIL NFR BLD AUTO: 0.1 %
FERRITIN SERPL-MCNC: 104 NG/ML
GLUCOSE SERPL-MCNC: 80 MG/DL
HCT VFR BLD CALC: 42 %
HGB BLD-MCNC: 13.8 G/DL
IMM GRANULOCYTES NFR BLD AUTO: 0.3 %
IRON SATN MFR SERPL: 22 %
IRON SERPL-MCNC: 81 UG/DL
LYMPHOCYTES # BLD AUTO: 2.78 K/UL
LYMPHOCYTES NFR BLD AUTO: 39.9 %
MAN DIFF?: NORMAL
MCHC RBC-ENTMCNC: 29.3 PG
MCHC RBC-ENTMCNC: 32.9 GM/DL
MCV RBC AUTO: 89.2 FL
MONOCYTES # BLD AUTO: 0.67 K/UL
MONOCYTES NFR BLD AUTO: 9.6 %
NEUTROPHILS # BLD AUTO: 3.44 K/UL
NEUTROPHILS NFR BLD AUTO: 49.4 %
PLATELET # BLD AUTO: 318 K/UL
POTASSIUM SERPL-SCNC: 5 MMOL/L
PROT SERPL-MCNC: 7.3 G/DL
RBC # BLD: 4.71 M/UL
RBC # FLD: 12.6 %
SODIUM SERPL-SCNC: 140 MMOL/L
TIBC SERPL-MCNC: 362 UG/DL
TSH SERPL-ACNC: 0.21 UIU/ML
UIBC SERPL-MCNC: 281 UG/DL
VIT B12 SERPL-MCNC: 663 PG/ML
WBC # FLD AUTO: 6.97 K/UL

## 2022-07-12 RX ORDER — LEVOTHYROXINE SODIUM 0.12 MG/1
125 TABLET ORAL
Qty: 90 | Refills: 1 | Status: ACTIVE | COMMUNITY
Start: 2020-04-30 | End: 1900-01-01

## 2022-07-12 NOTE — HISTORY OF PRESENT ILLNESS
[FreeTextEntry1] : Ms. Slade is a 26 year-old woman with a history of hypothyroidism presenting for follow-up. I saw her for an initial visit in August 2020 and last in January 2021; she was seen by Opal Almeida NP in February 2022.\par \par Hypothyroidism. \par She was diagnosed with hypothyroidism in January 2020 in the setting of fatigue and weight gain.\par She was started on levothyroxine 100 mcg daily in January 2020, increased to 112 mcg daily in April 2020 and 137 mcg daily in June 2020. Her dose of levothyroxine was adjusted to 150 mcg, 1 pill 6 days/week and 1.5 pills 1 day/week (average daily dose 160 mcg) during her pregnancy. She has been taking levothyroxine 137 mcg daily since March 2022 in the postpartum period. \par She is taking levothyroxine in the morning, on an empty stomach, with plain water, and waiting at least 30 minutes before eating. She is taking a prenatal vitamin and  from levothyroxine by at least four hours. \par No history of radiation exposure.\par No family history of thyroid disease.\par \par Interim History \par She gave birth to a baby boy in March 2022. She has been taking levothyroxine 137 mcg daily in the postpartum period.\par She has been breastfeeding. \par She recently returned to work and has been sleep training her son. \par She has had fatigue. She otherwise feels well. \par She works in  for [employer]. Her father is a cardiologist.\par Medical and surgical history, medications, allergies, social and family history reviewed and updated as needed.

## 2022-07-12 NOTE — ASSESSMENT
[FreeTextEntry1] : Hypothyroidism. She has resumed her levothyroxine dose previous to pregnancy and we will monitor her TSH level today. We have reviewed proper use and compliance with levothyroxine. Her weight-based dose of levothyroxine is around 108 mcg. Transglutaminase antibodies were checked due to levothyroxine requirements above her expected weight-based dose and were negative. I advised she may require additional levothyroxine if she starts a combined oral contraceptive pill with estrogen. We have discussed the increased levothyroxine requirements in pregnancy.\par Continue levothyroxine 137 mcg daily pending TSH for goal 0.5-2.5 uIU/mL \par \par Fatigue. We will ensure she is euthyroid and send additional laboratory evaluation as below.

## 2022-07-12 NOTE — PHYSICAL EXAM
[Alert] : alert [Healthy Appearance] : healthy appearance [No Acute Distress] : no acute distress [Normal Sclera/Conjunctiva] : normal sclera/conjunctiva [Normal Hearing] : hearing was normal [No Respiratory Distress] : no respiratory distress [Normal Insight/Judgement] : insight and judgment were intact [No Stigmata of Cushings Syndrome] : no stigmata of Cushings Syndrome [Normal Gait] : normal gait [Kyphosis] : no kyphosis present [Acanthosis Nigricans] : no acanthosis nigricans [de-identified] : no moon facies, no supraclavicular fat pads

## 2022-07-12 NOTE — ADDENDUM
[FreeTextEntry1] : Recent laboratory results as below; discussed with Ms. Mckeon. TSH 0.21 uIU/mL and recommended adjustment in levothyroxine from 137 to 125 mcg daily, with plan to repeat TSH in 8-12 weeks. Other test results within range. 7/12/22

## 2022-10-12 ENCOUNTER — NON-APPOINTMENT (OUTPATIENT)
Age: 27
End: 2022-10-12

## 2023-05-06 NOTE — OB PROVIDER H&P - ALERT: PERTINENT HISTORY
1st Trimester Sonogram/20 Week Level II Sonogram/BioPhysical Profile(s)/Fetal Non-Stress Test (NST)
no fever and no chills.

## 2024-06-23 ENCOUNTER — NON-APPOINTMENT (OUTPATIENT)
Age: 29
End: 2024-06-23

## 2025-04-30 DIAGNOSIS — K21.9 GASTRO-ESOPHAGEAL REFLUX DISEASE W/OUT ESOPHAGITIS: ICD-10-CM

## 2025-04-30 RX ORDER — OMEPRAZOLE 20 MG/1
20 CAPSULE, DELAYED RELEASE ORAL DAILY
Qty: 30 | Refills: 5 | Status: ACTIVE | COMMUNITY
Start: 2025-04-30 | End: 1900-01-01